# Patient Record
(demographics unavailable — no encounter records)

---

## 2025-03-10 NOTE — IMAGING
[de-identified] : LEFT KNEE EXAM Alignment: Neutral  Effusion: mild Atrophy: None                                                  Stable to Varus/valgus stress Posterior Drawer Test: negative Anterior Drawer Test: Negative Knee Extension/Flexion: 5 / 20  Medial/lateral compartments Medial joint line: POS Tenderness Lateral joint line: POS Tenderness Veronica test: POS  Patellofemoral joint Medial patellar facet: no tenderness Patellar grind: Negative  Tendons: Pes Anserine: No tenderness Gerdys Tubercle/ IT Band: No tenderness Quadriceps Tendon: No Tenderness patellar tendon: no Tenderness Tibial tubercle: not tenderness Calf: no Tenderness  Neurovascular exam Muscle strength: 5/5 Sensation to light touch: intact Distal pulses: 2+  IMAGIN/10/2025 Xrays of the Left Knee were taken demonstrating no signs of fractures, dislocations,or significant arthritis.

## 2025-03-10 NOTE — HISTORY OF PRESENT ILLNESS
[de-identified] : 03/10/2025 VIK he 27 year  is here today for evaluation of left knee. Patient reports pain for over 1 week ago after running on the treadmill. Patient visited [facility] had x-rays done. Patient notes pain is worse when bending and with stairs. Patient notes pain is localized. Patient denies N/T reports some sharp pain. Pain also with squatting.

## 2025-03-10 NOTE — ASSESSMENT
[FreeTextEntry1] : 27 year M with left knee with concern for meniscal tear and locked knee since he cannot bend beyond 20 degrees MRI of the left knee and fu after completion

## 2025-03-24 NOTE — IMAGING
[de-identified] : LEFT KNEE EXAM Alignment: Neutral  Effusion: mild Atrophy: None                                                  Stable to Varus/valgus stress Posterior Drawer Test: negative Anterior Drawer Test: Negative Knee Extension/Flexion: 5 / 20  Medial/lateral compartments Medial joint line: POS Tenderness Lateral joint line: POS Tenderness Veronica test: POS  Patellofemoral joint Medial patellar facet: no tenderness Patellar grind: Negative  Tendons: Pes Anserine: No tenderness Gerdys Tubercle/ IT Band: No tenderness Quadriceps Tendon: No Tenderness patellar tendon: no Tenderness Tibial tubercle: not tenderness Calf: no Tenderness  Neurovascular exam Muscle strength: 5/5 Sensation to light touch: intact Distal pulses: 2+  IMAGIN/10/2025 Xrays of the Left Knee were taken demonstrating no signs of fractures, dislocations,or significant arthritis.

## 2025-03-24 NOTE — ASSESSMENT
[FreeTextEntry1] : 27 year M with left knee with concern for meniscal tear and locked knee since he cannot bend beyond 20 degrees MRI of the left knee and fu after completion  03/24/2025 MRI with lateral facet  edema, patient is feeling better discoid lateral meniscus

## 2025-03-24 NOTE — HISTORY OF PRESENT ILLNESS
[de-identified] : 03/10/2025 VIK he 27 year  is here today for evaluation of left knee. Patient reports pain for over 1 week ago after running on the treadmill. Patient visited [facility] had x-rays done. Patient notes pain is worse when bending and with stairs. Patient notes pain is localized. Patient denies N/T reports some sharp pain. Pain also with squatting.   03/24/2025: patient is here to discuss MRI results. patient states since last visit notes doing better knee is improving.